# Patient Record
Sex: MALE | Race: WHITE
[De-identification: names, ages, dates, MRNs, and addresses within clinical notes are randomized per-mention and may not be internally consistent; named-entity substitution may affect disease eponyms.]

---

## 2018-04-30 ENCOUNTER — HOSPITAL ENCOUNTER (INPATIENT)
Dept: HOSPITAL 26 - MED | Age: 58
LOS: 3 days | Discharge: ANOTHER HOSPITAL | DRG: 750 | End: 2018-05-03
Payer: MEDICAID

## 2018-04-30 VITALS — DIASTOLIC BLOOD PRESSURE: 80 MMHG | SYSTOLIC BLOOD PRESSURE: 108 MMHG

## 2018-04-30 VITALS — WEIGHT: 235 LBS | HEIGHT: 68 IN | BODY MASS INDEX: 35.61 KG/M2

## 2018-04-30 VITALS — DIASTOLIC BLOOD PRESSURE: 79 MMHG | SYSTOLIC BLOOD PRESSURE: 115 MMHG

## 2018-04-30 DIAGNOSIS — F25.1: Primary | ICD-10-CM

## 2018-04-30 DIAGNOSIS — E78.00: ICD-10-CM

## 2018-04-30 DIAGNOSIS — R73.9: ICD-10-CM

## 2018-04-30 DIAGNOSIS — I11.0: ICD-10-CM

## 2018-04-30 DIAGNOSIS — F10.10: ICD-10-CM

## 2018-04-30 DIAGNOSIS — F32.9: ICD-10-CM

## 2018-04-30 DIAGNOSIS — I50.9: ICD-10-CM

## 2018-04-30 DIAGNOSIS — F17.210: ICD-10-CM

## 2018-04-30 DIAGNOSIS — Z59.0: ICD-10-CM

## 2018-04-30 DIAGNOSIS — R45.851: ICD-10-CM

## 2018-04-30 LAB
ALBUMIN FLD-MCNC: 3.4 G/DL (ref 3.4–5)
ANION GAP SERPL CALCULATED.3IONS-SCNC: 13.9 MMOL/L (ref 8–16)
APAP SERPL-MCNC: < 0.5 UG/ML (ref 10–30)
APPEARANCE UR: CLEAR
AST SERPL-CCNC: 19 U/L (ref 15–37)
BARBITURATES UR QL SCN: (no result) NG/ML
BASOPHILS # BLD AUTO: 0.1 K/UL (ref 0–0.22)
BASOPHILS NFR BLD AUTO: 0.8 % (ref 0–2)
BENZODIAZ UR QL SCN: (no result) NG/ML
BILIRUB SERPL-MCNC: 0.2 MG/DL (ref 0–1)
BILIRUB UR QL STRIP: NEGATIVE
BUN SERPL-MCNC: 13 MG/DL (ref 7–18)
BZE UR QL SCN: (no result) NG/ML
CANNABINOIDS UR QL SCN: (no result) NG/ML
CHLORIDE SERPL-SCNC: 103 MMOL/L (ref 98–107)
CO2 SERPL-SCNC: 27.3 MMOL/L (ref 21–32)
COLOR UR: (no result)
CREAT SERPL-MCNC: 0.9 MG/DL (ref 0.7–1.3)
EOSINOPHIL # BLD AUTO: 0.2 K/UL (ref 0–0.4)
EOSINOPHIL NFR BLD AUTO: 2.3 % (ref 0–4)
ERYTHROCYTE [DISTWIDTH] IN BLOOD BY AUTOMATED COUNT: 14 % (ref 11.6–13.7)
GFR SERPL CREATININE-BSD FRML MDRD: 112 ML/MIN (ref 90–?)
GLUCOSE SERPL-MCNC: 99 MG/DL (ref 74–106)
GLUCOSE UR STRIP-MCNC: NEGATIVE MG/DL
HCT VFR BLD AUTO: 41.2 % (ref 36–52)
HGB BLD-MCNC: 13.9 G/DL (ref 12–18)
HGB UR QL STRIP: NEGATIVE
KETONES TITR SERPL: NEGATIVE {TITER}
LEUKOCYTE ESTERASE UR QL STRIP: NEGATIVE
LYMPHOCYTES # BLD AUTO: 2.4 K/UL (ref 2–11.5)
LYMPHOCYTES NFR BLD AUTO: 30.1 % (ref 20.5–51.1)
MCH RBC QN AUTO: 30 PG (ref 27–31)
MCHC RBC AUTO-ENTMCNC: 34 G/DL (ref 33–37)
MCV RBC AUTO: 89.2 FL (ref 80–94)
MONOCYTES # BLD AUTO: 0.7 K/UL (ref 0.8–1)
MONOCYTES NFR BLD AUTO: 8.3 % (ref 1.7–9.3)
NEUTROPHILS # BLD AUTO: 4.7 K/UL (ref 1.8–7.7)
NEUTROPHILS NFR BLD AUTO: 58.5 % (ref 42.2–75.2)
NITRITE UR QL STRIP: NEGATIVE
OPIATES UR QL SCN: (no result) NG/ML
PCP UR QL SCN: (no result) NG/ML
PH UR STRIP: 6 [PH] (ref 5–9)
PLATELET # BLD AUTO: 181 K/UL (ref 140–450)
POTASSIUM SERPL-SCNC: 4.2 MMOL/L (ref 3.5–5.1)
RBC # BLD AUTO: 4.61 MIL/UL (ref 4.2–6.1)
SALICYLATES SERPL-MCNC: < 2.8 MG/DL (ref 2.8–20)
SODIUM SERPL-SCNC: 140 MMOL/L (ref 136–145)
WBC # BLD AUTO: 8.1 K/UL (ref 4.8–10.8)

## 2018-04-30 PROCEDURE — G0482 DRUG TEST DEF 15-21 CLASSES: HCPCS

## 2018-04-30 PROCEDURE — G0480 DRUG TEST DEF 1-7 CLASSES: HCPCS

## 2018-04-30 SDOH — ECONOMIC STABILITY - HOUSING INSECURITY: HOMELESSNESS: Z59.0

## 2018-04-30 NOTE — NUR
ADMITTED AND 58M FROM ER . CAME BY WHEELCHAIR . AWAKE,ALERT AND ORIENTED X4. CAME DUE TO 
HEARING VOICES.  HAD HX OF SUICIDAL IDEATION . PT IS AMBULATORY. ON MED -SURG. WITH HL ON 
THE LT AC#20. CLEAR AND PATENT. SKIN INTACT. ORIENTED TO HOSPITAL ROUTINES. BED ON LOW 
POSITION . CALL LIGHT AND URINAL PACED WITHIN EASY REACH. FREQUENT ROUNDS NEEDED  FOR SAFETY 
. PLAN OF CARE DISCUSSED AND VERBALIZED UNDERSTANDING. SPECIMEN COLLECTED FOR MRSA NARES. 
WILL FOLLOW UP ADMIT ORDERS. WILL CONTINUE TO MONITOR.

## 2018-04-30 NOTE — NUR
At this time pt is not a considered a psych pt. , clarified with charge nurse Liv ZAPATA. 
advised to give Call Center a call back when pt becomes a psych pt.

## 2018-04-30 NOTE — NUR
ASKED PT IF HE HAS PLAN TO HURT HIMSELF , HE SAID NOT AT THIS TIME. AND HE SAID HIS GOAL 
UPON DISCHARGE IS TO TAKE ALL HIS MEDICATIONS ON TIME.

## 2018-04-30 NOTE — NUR
PT WANTS TO TAKE HIS MEDICATIONS THAT HE TAKES AT HOME. PAGED YONI ABBOTT CALLED BACK 
AND HE SAID OK TO CONTINUE ALL HOME MEDICATIONS. DC THE PREVIOUS SEROQUEL ORDER AND FOLLOW 
WHAT PT TAKES AT HOME.

## 2018-04-30 NOTE — NUR
Patient will be admitted to care of DR. HOLLEY. Admited to MED SURG.  Will go 
to room 124 A. Belongings list completed.  Report to RN.

## 2018-04-30 NOTE — NUR
PATIENT PRESENTS TO ED WITH SUIDCIDAL IDEATION . PT STATES HE STARTED FEELING 
LIKE THIS 2 DAYS AGO, HAD A  READY TO CUT HIMSELF BUT HE STOPPED. 
BROUGHT HIMSELF TO ER FOR HELP. DENIES N/V/D; SKIN IS PINK/WARM/DRY; AAOX4 WITH 
EVEN AND STEADY GAIT; LUNGS CLEAR BL; HR EVEN AND REGULAR; PT DENIES ANY FEVER, 
CP, SOB, OR COUGH AT THIS TIME; PATIENT STATES PAIN OF 0/10 AT THIS TIME; VSS; 
PATIENT POSITIONED FOR COMFORT; HOB ELEVATED; BEDRAILS UP X1; BED DOWN. ER MD 
MADE AWARE OF PT STATUS.

## 2018-05-01 VITALS — SYSTOLIC BLOOD PRESSURE: 131 MMHG | DIASTOLIC BLOOD PRESSURE: 88 MMHG

## 2018-05-01 VITALS — SYSTOLIC BLOOD PRESSURE: 116 MMHG | DIASTOLIC BLOOD PRESSURE: 78 MMHG

## 2018-05-01 VITALS — SYSTOLIC BLOOD PRESSURE: 116 MMHG | DIASTOLIC BLOOD PRESSURE: 82 MMHG

## 2018-05-01 VITALS — SYSTOLIC BLOOD PRESSURE: 118 MMHG | DIASTOLIC BLOOD PRESSURE: 76 MMHG

## 2018-05-01 LAB
ANION GAP SERPL CALCULATED.3IONS-SCNC: 10.3 MMOL/L (ref 8–16)
BASOPHILS # BLD AUTO: 0.1 K/UL (ref 0–0.22)
BASOPHILS NFR BLD AUTO: 0.8 % (ref 0–2)
BUN SERPL-MCNC: 14 MG/DL (ref 7–18)
CHLORIDE SERPL-SCNC: 102 MMOL/L (ref 98–107)
CO2 SERPL-SCNC: 30.2 MMOL/L (ref 21–32)
CREAT SERPL-MCNC: 1 MG/DL (ref 0.7–1.3)
EOSINOPHIL # BLD AUTO: 0.3 K/UL (ref 0–0.4)
EOSINOPHIL NFR BLD AUTO: 3.7 % (ref 0–4)
ERYTHROCYTE [DISTWIDTH] IN BLOOD BY AUTOMATED COUNT: 14.4 % (ref 11.6–13.7)
GFR SERPL CREATININE-BSD FRML MDRD: 99 ML/MIN (ref 90–?)
GLUCOSE SERPL-MCNC: 96 MG/DL (ref 74–106)
HCT VFR BLD AUTO: 42.9 % (ref 36–52)
HGB BLD-MCNC: 14.4 G/DL (ref 12–18)
LYMPHOCYTES # BLD AUTO: 2.4 K/UL (ref 2–11.5)
LYMPHOCYTES NFR BLD AUTO: 34.4 % (ref 20.5–51.1)
MCH RBC QN AUTO: 30 PG (ref 27–31)
MCHC RBC AUTO-ENTMCNC: 34 G/DL (ref 33–37)
MCV RBC AUTO: 90.4 FL (ref 80–94)
MONOCYTES # BLD AUTO: 0.6 K/UL (ref 0.8–1)
MONOCYTES NFR BLD AUTO: 8.5 % (ref 1.7–9.3)
NEUTROPHILS # BLD AUTO: 3.6 K/UL (ref 1.8–7.7)
NEUTROPHILS NFR BLD AUTO: 52.6 % (ref 42.2–75.2)
PLATELET # BLD AUTO: 184 K/UL (ref 140–450)
POTASSIUM SERPL-SCNC: 4.5 MMOL/L (ref 3.5–5.1)
RBC # BLD AUTO: 4.74 MIL/UL (ref 4.2–6.1)
SODIUM SERPL-SCNC: 138 MMOL/L (ref 136–145)
WBC # BLD AUTO: 6.9 K/UL (ref 4.8–10.8)

## 2018-05-01 RX ADMIN — CITALOPRAM SCH MG: 20 TABLET ORAL at 09:33

## 2018-05-01 RX ADMIN — ASPIRIN TAB DELAYED RELEASE 81 MG SCH MG: 81 TABLET DELAYED RESPONSE at 09:34

## 2018-05-01 RX ADMIN — Medication SCH MG: at 09:34

## 2018-05-01 RX ADMIN — Medication SCH MG: at 20:36

## 2018-05-01 RX ADMIN — ATORVASTATIN CALCIUM SCH MG: 20 TABLET, FILM COATED ORAL at 09:33

## 2018-05-01 NOTE — NUR
PATIENT HAS BEEN SCREENED AND CATEGORIZED AS MODERATE NUTRITION RISK. PATIENT WILL BE SEEN 
WITHIN 3-5 DAYS OF ADMISSION.



5/3/18  5/5/18



RAUL VALADEZ RD

## 2018-05-01 NOTE — NUR
Packet sent to following facilities ,[facility] ,[facility], [facility] , [facility], and [place] at this time there are no vacancy , will 
continue to call for placement.

## 2018-05-01 NOTE — NUR
ASSUMED CARE OF PATIENT, AWAKE, ALERT AND ORIENTED. MOVE TO 109B AMBULATORY ACCOMPANIED BY 
SITTER. NO COMPLAINS. CALL LIGHT WITHIN REACH.

## 2018-05-01 NOTE — NUR
PT AWAKE.  NO SOB  NOTED. NO COMPLAINTS MADE, PT IS PLEASANT. NO SUICIDAL THOUGHTS AS PER PT 
AT THIS TIME. WITH 1:1 SITTER AT THE BEDSIDE. ENDORSED TO NEXT SHIFT NURSE FOR CONTINUITY OF 
CARE.

## 2018-05-01 NOTE — NUR
PAGED DR. STEARNS TO FOLLOW UP WITH PSYCH EVALUATION. PT'S FACE SHEET FAXED TO [facility] 
PSYCHOLOGICAL [place] AS WELL, CONFIRMATION RECEIVED.

## 2018-05-01 NOTE — NUR
RECEIVED PT ON BED AAOX4. NO SOB NOTED. NO C/O PAIN AT THIS TIME. NO SUICIDAL THOUGHTS AND 
NOT HEARING VOICES AT THIS TIME AS PER PT. PT IS PLEASANT AND WELL KEMPT IN APPEARANCE. IV 
TO LT AC PATENT AND INTACT. CHEST CLEAR. ABDOMEN SOFT, BOWEL SOUNDS. NO EDEMA NOTED. 
INSTRUCTED PT TO CALL FOR ASSISTANCE, CALL LIGHT WITHIN REACH. PT VERBALIZED UNDERSTANDING.

## 2018-05-01 NOTE — NUR
PLAN OF CARE DISCUSSED WITH PATIENT, VERBALIZED UNDERSTANDING WELL. SITTER 1:1 AT BEDSIDE AT 
ALL TIMES. CALL LIGHT WITHIN REACH.

## 2018-05-02 VITALS — SYSTOLIC BLOOD PRESSURE: 92 MMHG | DIASTOLIC BLOOD PRESSURE: 57 MMHG

## 2018-05-02 VITALS — DIASTOLIC BLOOD PRESSURE: 76 MMHG | SYSTOLIC BLOOD PRESSURE: 118 MMHG

## 2018-05-02 VITALS — SYSTOLIC BLOOD PRESSURE: 105 MMHG | DIASTOLIC BLOOD PRESSURE: 63 MMHG

## 2018-05-02 VITALS — DIASTOLIC BLOOD PRESSURE: 57 MMHG | SYSTOLIC BLOOD PRESSURE: 92 MMHG

## 2018-05-02 LAB
ANION GAP SERPL CALCULATED.3IONS-SCNC: 10.2 MMOL/L (ref 8–16)
BASOPHILS # BLD AUTO: 0.1 K/UL (ref 0–0.22)
BASOPHILS NFR BLD AUTO: 1 % (ref 0–2)
BUN SERPL-MCNC: 13 MG/DL (ref 7–18)
CHLORIDE SERPL-SCNC: 102 MMOL/L (ref 98–107)
CO2 SERPL-SCNC: 33.2 MMOL/L (ref 21–32)
CREAT SERPL-MCNC: 1 MG/DL (ref 0.7–1.3)
EOSINOPHIL # BLD AUTO: 0.3 K/UL (ref 0–0.4)
EOSINOPHIL NFR BLD AUTO: 4 % (ref 0–4)
ERYTHROCYTE [DISTWIDTH] IN BLOOD BY AUTOMATED COUNT: 14.3 % (ref 11.6–13.7)
GFR SERPL CREATININE-BSD FRML MDRD: 99 ML/MIN (ref 90–?)
GLUCOSE SERPL-MCNC: 90 MG/DL (ref 74–106)
HCT VFR BLD AUTO: 42 % (ref 36–52)
HGB BLD-MCNC: 14.2 G/DL (ref 12–18)
LYMPHOCYTES # BLD AUTO: 2.4 K/UL (ref 2–11.5)
LYMPHOCYTES NFR BLD AUTO: 34 % (ref 20.5–51.1)
MCH RBC QN AUTO: 30 PG (ref 27–31)
MCHC RBC AUTO-ENTMCNC: 34 G/DL (ref 33–37)
MCV RBC AUTO: 90.1 FL (ref 80–94)
MONOCYTES # BLD AUTO: 0.6 K/UL (ref 0.8–1)
MONOCYTES NFR BLD AUTO: 8.7 % (ref 1.7–9.3)
NEUTROPHILS # BLD AUTO: 3.6 K/UL (ref 1.8–7.7)
NEUTROPHILS NFR BLD AUTO: 52.3 % (ref 42.2–75.2)
PLATELET # BLD AUTO: 185 K/UL (ref 140–450)
POTASSIUM SERPL-SCNC: 4.4 MMOL/L (ref 3.5–5.1)
RBC # BLD AUTO: 4.66 MIL/UL (ref 4.2–6.1)
SODIUM SERPL-SCNC: 141 MMOL/L (ref 136–145)
WBC # BLD AUTO: 6.9 K/UL (ref 4.8–10.8)

## 2018-05-02 RX ADMIN — CITALOPRAM SCH MG: 20 TABLET ORAL at 08:41

## 2018-05-02 RX ADMIN — ASPIRIN TAB DELAYED RELEASE 81 MG SCH MG: 81 TABLET DELAYED RESPONSE at 08:42

## 2018-05-02 RX ADMIN — Medication SCH MG: at 20:40

## 2018-05-02 RX ADMIN — ATORVASTATIN CALCIUM SCH MG: 20 TABLET, FILM COATED ORAL at 08:41

## 2018-05-02 RX ADMIN — Medication SCH MG: at 08:40

## 2018-05-02 NOTE — NUR
INFORMED DR. YONI HOLLEY THAT CHARGE NURSE JUANITO WAS INFORMED BY CORPORATE STAFF,  PATIENT 
HAS ALREADY A BED AVAILABLE AT [facility] AND CAN BE TRANSFERRED TONIGHT. ORDERED 
TO DISCHARGED PATIENT.

## 2018-05-02 NOTE — NUR
MANDY FROM [facility] CALLED EARLIER AND ASKED SOME INFORMATION FROM PT.GIVEN TO 
HIM.HE SAID THEY HAVE BED AND HE WILL TALKED WITH MD AND THEN CALL ME BACK.HE CALLED BACK.PT 
WILL GO TO [address] ACCEPTING DR.IS .HE ASKED TO FAX UDS RESULT TO FAX # 
[phone].IT DONE ALSO ASKED TO CALL [phone] AND GIVE THEM REPORT.INFORMED DAVID SIMONS 
PT.'S NURSE.

## 2018-05-02 NOTE — NUR
PATIENT WAS SLEEPING COMFORTABLY, EASILY AROUSABLE BY NAME. RESPIRATION EVEN, UNLABOR ON 
ROOM AIR. DENIED SUICIDAL THOUGHT. NO DISTRESS NOTED AT THIS TIME. VS IS STABLE. 1:1 SITTER 
ENSURED

## 2018-05-02 NOTE — NUR
PATIENT IS SLEEPING COMFORTABLY. RESPIRATION EVEN, UNLABOR ON ROOM AIR. NO DISTRESS NOTED AT 
THIS TIME. 1:1 SITTER ENSURED

## 2018-05-02 NOTE — NUR
PATIENT SLEEPING COMFORTABLY. RESPIRATION EVEN, UNLABOR ON ROOM AIR. NO DISTRESS NOTED AT 
THIS TIME. 1:1 SITTER ENSURED

## 2018-05-02 NOTE — NUR
RECD. RESTING IN BED, A/OX4. RESPIRATION EVEN AND UNLABORED. IV SALINE LOCK AT THE LEFT HAND 
G20, PATENT AND INTACT.  STATED HE IS  STILL HEARING VOICES OCCASIONALLY TELLING TO 
KILL/HURT SELF. PLAN OF CARE FOR THE SHIFT DISCUSSED. VERBALIZED UNDERSTANDING. DENIES PAIN 
0/10. WILL CONTINUE TO MONITOR BEHAVIOR AND ENSURE SAFETY BEING THE NURSE AND SITTER FOR THE 
PATIENT.

## 2018-05-02 NOTE — NUR
PATIENT AWAKE, ALERT, EATING BREAKFAST. RESPIRATION EVEN, UNLABOR ON ROOM AIR. SKIN DRY AND 
WARM. IV PATENT AND INTACT. DENIED SUICIDAL IDEA. PATIENT STATED HE STILL HAS AUDIO 
HALLUCINATION, THAT THE VOICE TOLD ME TO START FIRE AND KILL HIMSELF. 1:1 SITTER IS ENSURED. 
PLAN OF CARE WAS DISCUSSED WITH PATIENT.

## 2018-05-02 NOTE — NUR
Placement packets have been resent to [facility], [facility], [facility], [facility], and [facility].

## 2018-05-02 NOTE — NUR
PATIENT AWAKE, ALERT. RESPIRATION EVEN, UNLABOR ON ROOM AIR. NO DISTRESS NOTED AT THIS TIME. 
1:1 SITTER ENSURED.

## 2018-05-03 NOTE — NUR
TAKEN TO ER PARKING IN STABLE CONDITION VIA GURNEY ACCOMPANIED BY TWO [facility] AMBULANCE 
PERSONNEL FOR TRANSFER TO [facility]. SUPERVISOR VIOLET INFORMED.

## 2018-05-03 NOTE — NUR
PATIENT INFORMED OF TRANSFER TO [facility]. DISCHARGE INSTRUCTIONS GIVEN. 
VERBALIZED UNDERSTANDING. IV DISCONTINUED.